# Patient Record
Sex: MALE | Race: WHITE | NOT HISPANIC OR LATINO
[De-identification: names, ages, dates, MRNs, and addresses within clinical notes are randomized per-mention and may not be internally consistent; named-entity substitution may affect disease eponyms.]

---

## 2017-04-03 ENCOUNTER — APPOINTMENT (OUTPATIENT)
Dept: ORTHOPEDIC SURGERY | Facility: CLINIC | Age: 72
End: 2017-04-03

## 2017-04-03 VITALS
HEART RATE: 67 BPM | SYSTOLIC BLOOD PRESSURE: 153 MMHG | BODY MASS INDEX: 24.43 KG/M2 | WEIGHT: 152 LBS | DIASTOLIC BLOOD PRESSURE: 71 MMHG | HEIGHT: 66 IN

## 2017-12-20 ENCOUNTER — OUTPATIENT (OUTPATIENT)
Dept: OUTPATIENT SERVICES | Facility: HOSPITAL | Age: 72
LOS: 1 days | End: 2017-12-20
Payer: MEDICARE

## 2017-12-20 VITALS
TEMPERATURE: 98 F | DIASTOLIC BLOOD PRESSURE: 83 MMHG | SYSTOLIC BLOOD PRESSURE: 136 MMHG | HEIGHT: 65.5 IN | WEIGHT: 149.91 LBS | OXYGEN SATURATION: 98 % | HEART RATE: 81 BPM | RESPIRATION RATE: 18 BRPM

## 2017-12-20 DIAGNOSIS — R22.9 LOCALIZED SWELLING, MASS AND LUMP, UNSPECIFIED: ICD-10-CM

## 2017-12-20 DIAGNOSIS — Z98.890 OTHER SPECIFIED POSTPROCEDURAL STATES: Chronic | ICD-10-CM

## 2017-12-20 DIAGNOSIS — Z01.818 ENCOUNTER FOR OTHER PREPROCEDURAL EXAMINATION: ICD-10-CM

## 2017-12-20 DIAGNOSIS — S60.459A SUPERFICIAL FOREIGN BODY OF UNSPECIFIED FINGER, INITIAL ENCOUNTER: ICD-10-CM

## 2017-12-20 LAB
ANION GAP SERPL CALC-SCNC: 8 MMOL/L — SIGNIFICANT CHANGE UP (ref 5–17)
BUN SERPL-MCNC: 16 MG/DL — SIGNIFICANT CHANGE UP (ref 7–23)
CALCIUM SERPL-MCNC: 9.3 MG/DL — SIGNIFICANT CHANGE UP (ref 8.4–10.5)
CHLORIDE SERPL-SCNC: 103 MMOL/L — SIGNIFICANT CHANGE UP (ref 96–108)
CO2 SERPL-SCNC: 30 MMOL/L — SIGNIFICANT CHANGE UP (ref 22–31)
CREAT SERPL-MCNC: 0.75 MG/DL — SIGNIFICANT CHANGE UP (ref 0.5–1.3)
GLUCOSE SERPL-MCNC: 121 MG/DL — HIGH (ref 70–99)
HBA1C BLD-MCNC: 6.3 % — HIGH (ref 4–5.6)
HCT VFR BLD CALC: 39.5 % — SIGNIFICANT CHANGE UP (ref 39–50)
HGB BLD-MCNC: 13.2 G/DL — SIGNIFICANT CHANGE UP (ref 13–17)
MCHC RBC-ENTMCNC: 33.3 GM/DL — SIGNIFICANT CHANGE UP (ref 32–36)
MCHC RBC-ENTMCNC: 33.4 PG — SIGNIFICANT CHANGE UP (ref 27–34)
MCV RBC AUTO: 100.3 FL — HIGH (ref 80–100)
PLATELET # BLD AUTO: 185 K/UL — SIGNIFICANT CHANGE UP (ref 150–400)
POTASSIUM SERPL-MCNC: 5 MMOL/L — SIGNIFICANT CHANGE UP (ref 3.5–5.3)
POTASSIUM SERPL-SCNC: 5 MMOL/L — SIGNIFICANT CHANGE UP (ref 3.5–5.3)
RBC # BLD: 3.94 M/UL — LOW (ref 4.2–5.8)
RBC # FLD: 11.8 % — SIGNIFICANT CHANGE UP (ref 10.3–14.5)
SODIUM SERPL-SCNC: 141 MMOL/L — SIGNIFICANT CHANGE UP (ref 135–145)
WBC # BLD: 6.1 K/UL — SIGNIFICANT CHANGE UP (ref 3.8–10.5)
WBC # FLD AUTO: 6.1 K/UL — SIGNIFICANT CHANGE UP (ref 3.8–10.5)

## 2017-12-20 PROCEDURE — 93005 ELECTROCARDIOGRAM TRACING: CPT

## 2017-12-20 PROCEDURE — 93010 ELECTROCARDIOGRAM REPORT: CPT | Mod: NC

## 2017-12-20 PROCEDURE — 85027 COMPLETE CBC AUTOMATED: CPT

## 2017-12-20 PROCEDURE — 80048 BASIC METABOLIC PNL TOTAL CA: CPT

## 2017-12-20 PROCEDURE — G0463: CPT

## 2017-12-20 PROCEDURE — 83036 HEMOGLOBIN GLYCOSYLATED A1C: CPT

## 2017-12-20 RX ORDER — PREGABALIN 225 MG/1
3 CAPSULE ORAL
Qty: 0 | Refills: 0 | COMMUNITY

## 2017-12-20 RX ORDER — SITAGLIPTIN AND METFORMIN HYDROCHLORIDE 500; 50 MG/1; MG/1
1 TABLET, FILM COATED ORAL
Qty: 0 | Refills: 0 | COMMUNITY

## 2017-12-20 RX ORDER — GLIMEPIRIDE 1 MG
0.5 TABLET ORAL
Qty: 0 | Refills: 0 | COMMUNITY

## 2017-12-20 NOTE — H&P PST ADULT - NSANTHOSAYNRD_GEN_A_CORE
No. SIDRA screening performed.  STOP BANG Legend: 0-2 = LOW Risk; 3-4 = INTERMEDIATE Risk; 5-8 = HIGH Risk

## 2019-01-18 PROBLEM — E11.9 TYPE 2 DIABETES MELLITUS WITHOUT COMPLICATIONS: Chronic | Status: ACTIVE | Noted: 2017-12-20

## 2019-01-18 PROBLEM — R22.9 LOCALIZED SWELLING, MASS AND LUMP, UNSPECIFIED: Chronic | Status: ACTIVE | Noted: 2017-12-20

## 2019-01-24 ENCOUNTER — APPOINTMENT (OUTPATIENT)
Dept: ORTHOPEDIC SURGERY | Facility: CLINIC | Age: 74
End: 2019-01-24
Payer: MEDICARE

## 2019-01-24 VITALS
HEIGHT: 66 IN | BODY MASS INDEX: 24.59 KG/M2 | HEART RATE: 76 BPM | DIASTOLIC BLOOD PRESSURE: 78 MMHG | WEIGHT: 153 LBS | SYSTOLIC BLOOD PRESSURE: 136 MMHG

## 2019-01-24 PROCEDURE — 73502 X-RAY EXAM HIP UNI 2-3 VIEWS: CPT | Mod: RT

## 2019-01-24 PROCEDURE — 72040 X-RAY EXAM NECK SPINE 2-3 VW: CPT

## 2019-01-24 PROCEDURE — 99214 OFFICE O/P EST MOD 30 MIN: CPT

## 2019-01-25 ENCOUNTER — APPOINTMENT (OUTPATIENT)
Dept: RADIOLOGY | Facility: CLINIC | Age: 74
End: 2019-01-25
Payer: MEDICARE

## 2019-01-25 ENCOUNTER — OUTPATIENT (OUTPATIENT)
Dept: OUTPATIENT SERVICES | Facility: HOSPITAL | Age: 74
LOS: 1 days | End: 2019-01-25
Payer: MEDICARE

## 2019-01-25 DIAGNOSIS — Z98.890 OTHER SPECIFIED POSTPROCEDURAL STATES: Chronic | ICD-10-CM

## 2019-01-25 DIAGNOSIS — M25.551 PAIN IN RIGHT HIP: ICD-10-CM

## 2019-01-25 PROCEDURE — 73525 CONTRAST X-RAY OF HIP: CPT

## 2019-01-25 PROCEDURE — 73525 CONTRAST X-RAY OF HIP: CPT | Mod: 26,RT

## 2019-01-25 PROCEDURE — 27093 INJECTION FOR HIP X-RAY: CPT | Mod: RT

## 2019-01-25 PROCEDURE — 27093 INJECTION FOR HIP X-RAY: CPT

## 2019-02-11 ENCOUNTER — APPOINTMENT (OUTPATIENT)
Dept: ORTHOPEDIC SURGERY | Facility: CLINIC | Age: 74
End: 2019-02-11
Payer: MEDICARE

## 2019-02-11 VITALS — DIASTOLIC BLOOD PRESSURE: 76 MMHG | SYSTOLIC BLOOD PRESSURE: 167 MMHG | HEART RATE: 71 BPM

## 2019-02-11 DIAGNOSIS — M25.551 PAIN IN RIGHT HIP: ICD-10-CM

## 2019-02-11 PROCEDURE — 99213 OFFICE O/P EST LOW 20 MIN: CPT | Mod: 25

## 2019-02-11 PROCEDURE — 20610 DRAIN/INJ JOINT/BURSA W/O US: CPT | Mod: RT

## 2019-03-14 ENCOUNTER — RX RENEWAL (OUTPATIENT)
Age: 74
End: 2019-03-14

## 2019-12-19 ENCOUNTER — APPOINTMENT (OUTPATIENT)
Dept: ORTHOPEDIC SURGERY | Facility: CLINIC | Age: 74
End: 2019-12-19
Payer: MEDICARE

## 2019-12-19 VITALS
DIASTOLIC BLOOD PRESSURE: 84 MMHG | SYSTOLIC BLOOD PRESSURE: 164 MMHG | BODY MASS INDEX: 24.59 KG/M2 | HEART RATE: 73 BPM | HEIGHT: 66 IN | WEIGHT: 153 LBS

## 2019-12-19 DIAGNOSIS — M79.641 PAIN IN RIGHT HAND: ICD-10-CM

## 2019-12-19 PROCEDURE — 99214 OFFICE O/P EST MOD 30 MIN: CPT

## 2019-12-19 PROCEDURE — 73130 X-RAY EXAM OF HAND: CPT | Mod: RT

## 2020-01-31 ENCOUNTER — APPOINTMENT (OUTPATIENT)
Dept: ORTHOPEDIC SURGERY | Facility: CLINIC | Age: 75
End: 2020-01-31
Payer: MEDICARE

## 2020-01-31 VITALS
SYSTOLIC BLOOD PRESSURE: 160 MMHG | BODY MASS INDEX: 24.59 KG/M2 | WEIGHT: 153 LBS | HEART RATE: 76 BPM | DIASTOLIC BLOOD PRESSURE: 88 MMHG | HEIGHT: 66 IN

## 2020-01-31 DIAGNOSIS — M79.604 PAIN IN RIGHT LEG: ICD-10-CM

## 2020-01-31 PROCEDURE — 73590 X-RAY EXAM OF LOWER LEG: CPT | Mod: RT

## 2020-01-31 PROCEDURE — 99214 OFFICE O/P EST MOD 30 MIN: CPT

## 2020-05-12 ENCOUNTER — APPOINTMENT (OUTPATIENT)
Dept: ORTHOPEDIC SURGERY | Facility: CLINIC | Age: 75
End: 2020-05-12
Payer: MEDICARE

## 2020-05-12 VITALS
DIASTOLIC BLOOD PRESSURE: 91 MMHG | WEIGHT: 153 LBS | BODY MASS INDEX: 24.59 KG/M2 | TEMPERATURE: 98.1 F | SYSTOLIC BLOOD PRESSURE: 185 MMHG | HEIGHT: 66 IN | HEART RATE: 80 BPM

## 2020-05-12 DIAGNOSIS — M54.5 LOW BACK PAIN: ICD-10-CM

## 2020-05-12 DIAGNOSIS — M54.32 SCIATICA, LEFT SIDE: ICD-10-CM

## 2020-05-12 PROCEDURE — 99214 OFFICE O/P EST MOD 30 MIN: CPT

## 2020-05-12 PROCEDURE — 72100 X-RAY EXAM L-S SPINE 2/3 VWS: CPT

## 2020-12-03 ENCOUNTER — TRANSCRIPTION ENCOUNTER (OUTPATIENT)
Age: 75
End: 2020-12-03

## 2020-12-03 ENCOUNTER — APPOINTMENT (OUTPATIENT)
Dept: NEUROSURGERY | Facility: CLINIC | Age: 75
End: 2020-12-03
Payer: MEDICARE

## 2020-12-03 VITALS — SYSTOLIC BLOOD PRESSURE: 158 MMHG | DIASTOLIC BLOOD PRESSURE: 82 MMHG

## 2020-12-03 VITALS
RESPIRATION RATE: 16 BRPM | HEIGHT: 66 IN | BODY MASS INDEX: 25.71 KG/M2 | TEMPERATURE: 97.2 F | OXYGEN SATURATION: 99 % | HEART RATE: 67 BPM | SYSTOLIC BLOOD PRESSURE: 153 MMHG | DIASTOLIC BLOOD PRESSURE: 80 MMHG | WEIGHT: 160 LBS

## 2020-12-03 DIAGNOSIS — Z78.9 OTHER SPECIFIED HEALTH STATUS: ICD-10-CM

## 2020-12-03 PROCEDURE — 99214 OFFICE O/P EST MOD 30 MIN: CPT

## 2020-12-03 RX ORDER — MELOXICAM 15 MG/1
15 TABLET ORAL DAILY
Qty: 30 | Refills: 1 | Status: DISCONTINUED | COMMUNITY
Start: 2019-03-14 | End: 2020-12-03

## 2020-12-03 RX ORDER — GLIMEPIRIDE 4 MG/1
TABLET ORAL
Refills: 0 | Status: ACTIVE | COMMUNITY

## 2020-12-10 NOTE — REASON FOR VISIT
[New Patient Visit] : a new patient visit [FreeTextEntry1] : Pulsatile Tinnitus [Follow-Up: _____] : a [unfilled] follow-up visit [Spouse] : spouse

## 2020-12-10 NOTE — HISTORY OF PRESENT ILLNESS
[FreeTextEntry1] : Miik Davila is a pleasant right handed 75 year old gentleman who presents for follow up regarding BILATERAL tinnitus.  Pt describes the noise as a loud pitched constant noise that started about 3 years ago however he reports a new noise in both ears that started about 1 month ago.  The new noise is intermittent in both ears and occurs when he turns head to either side.  Noise does not correlate to heartbeat.\par \par Gentle pressure on either side of neck does not change the noise and noise affects his quality of life 5-6/10.\par \par He had an audiogram with bilateral hearing loss L > R.  He has hearing aids bilaterally.\par \par He denies headaches, blurry and double vision.\par

## 2020-12-10 NOTE — ASSESSMENT
[FreeTextEntry1] : IMPRESSION:\par Constant tinnitus both ears with new intermittent sound in both ears when he turns his head to either side\par The sound is predominantly non-pulsatile\par Work-up in the past was unremarkable\par \par \par PLAN:\par 1. MRA brain w/wo TRICKS protocol if symptoms persist.\par 2. F/U after imaging.\par 3. Contact information for Dr.Maja Abraham provided.

## 2020-12-10 NOTE — HISTORY OF PRESENT ILLNESS
[FreeTextEntry1] : Miki Davila is a pleasant right handed 75 year old gentleman who presents for follow up regarding BILATERAL tinnitus.  Pt describes the noise as a loud pitched constant noise that started about 3 years ago however he reports a new noise in both ears that started about 1 month ago.  The new noise is intermittent in both ears and occurs when he turns head to either side.  Noise does not correlate to heartbeat.\par \par Gentle pressure on either side of neck does not change the noise and noise affects his quality of life 5-6/10.\par \par He had an audiogram with bilateral hearing loss L > R.  He has hearing aids bilaterally.\par \par He denies headaches, blurry and double vision.\par

## 2020-12-10 NOTE — PHYSICAL EXAM
[General Appearance - Alert] : alert [General Appearance - In No Acute Distress] : in no acute distress [General Appearance - Well Nourished] : well nourished [General Appearance - Well Developed] : well developed [General Appearance - Well-Appearing] : healthy appearing [Oriented To Time, Place, And Person] : oriented to person, place, and time [Impaired Insight] : insight and judgment were intact [Affect] : the affect was normal [Memory Recent] : recent memory was not impaired [Person] : oriented to person [Place] : oriented to place [Time] : oriented to time [Cranial Nerves Optic (II)] : visual acuity intact bilaterally,  pupils equal round and reactive to light [Cranial Nerves Oculomotor (III)] : extraocular motion intact [Cranial Nerves Trigeminal (V)] : facial sensation intact symmetrically [Cranial Nerves Facial (VII)] : face symmetrical [Cranial Nerves Vestibulocochlear (VIII)] : hearing was intact bilaterally [Cranial Nerves Glossopharyngeal (IX)] : tongue and palate midline [Cranial Nerves Accessory (XI - Cranial And Spinal)] : head turning and shoulder shrug symmetric [Cranial Nerves Hypoglossal (XII)] : there was no tongue deviation with protrusion [Motor Strength] : muscle strength was normal in all four extremities [Motor Handedness Right-Handed] : the patient is right hand dominant [Balance] : balance was intact [Sclera] : the sclera and conjunctiva were normal [PERRL With Normal Accommodation] : pupils were equal in size, round, reactive to light, with normal accommodation [Extraocular Movements] : extraocular movements were intact [Outer Ear] : the ears and nose were normal in appearance [Hearing Threshold Finger Rub Not [place]] : hearing was normal [Oropharynx] : the oropharynx was normal [Neck Appearance] : the appearance of the neck was normal [Respiration, Rhythm And Depth] : normal respiratory rhythm and effort [Exaggerated Use Of Accessory Muscles For Inspiration] : no accessory muscle use [Heart Rate And Rhythm] : heart rate was normal and rhythm regular [Abnormal Walk] : normal gait [Involuntary Movements] : no involuntary movements were seen [Motor Tone] : muscle strength and tone were normal [Skin Color & Pigmentation] : normal skin color and pigmentation [] : no rash

## 2020-12-10 NOTE — DATA REVIEWED
[de-identified] : MRI BRAIN IAC W/WO DONE 3/9/20 AT [facility] AND MR BRAIN IAC W/WO 3/17/17 DONE AT [facility] [place]

## 2020-12-10 NOTE — PHYSICAL EXAM
[General Appearance - Alert] : alert [General Appearance - In No Acute Distress] : in no acute distress [General Appearance - Well Nourished] : well nourished [General Appearance - Well Developed] : well developed [General Appearance - Well-Appearing] : healthy appearing [Oriented To Time, Place, And Person] : oriented to person, place, and time [Impaired Insight] : insight and judgment were intact [Affect] : the affect was normal [Memory Recent] : recent memory was not impaired [Person] : oriented to person [Place] : oriented to place [Time] : oriented to time [Cranial Nerves Optic (II)] : visual acuity intact bilaterally,  pupils equal round and reactive to light [Cranial Nerves Oculomotor (III)] : extraocular motion intact [Cranial Nerves Trigeminal (V)] : facial sensation intact symmetrically [Cranial Nerves Facial (VII)] : face symmetrical [Cranial Nerves Vestibulocochlear (VIII)] : hearing was intact bilaterally [Cranial Nerves Glossopharyngeal (IX)] : tongue and palate midline [Cranial Nerves Accessory (XI - Cranial And Spinal)] : head turning and shoulder shrug symmetric [Cranial Nerves Hypoglossal (XII)] : there was no tongue deviation with protrusion [Motor Strength] : muscle strength was normal in all four extremities [Motor Handedness Right-Handed] : the patient is right hand dominant [Balance] : balance was intact [Sclera] : the sclera and conjunctiva were normal [PERRL With Normal Accommodation] : pupils were equal in size, round, reactive to light, with normal accommodation [Extraocular Movements] : extraocular movements were intact [Outer Ear] : the ears and nose were normal in appearance [Hearing Threshold Finger Rub Not [place]] : hearing was normal [Oropharynx] : the oropharynx was normal [Neck Appearance] : the appearance of the neck was normal [Respiration, Rhythm And Depth] : normal respiratory rhythm and effort [Exaggerated Use Of Accessory Muscles For Inspiration] : no accessory muscle use [Heart Rate And Rhythm] : heart rate was normal and rhythm regular [Abnormal Walk] : normal gait [Involuntary Movements] : no involuntary movements were seen [Motor Tone] : muscle strength and tone were normal [Skin Color & Pigmentation] : normal skin color and pigmentation [] : no rash

## 2020-12-10 NOTE — DATA REVIEWED
[de-identified] : MRI BRAIN IAC W/WO DONE 3/9/20 AT [facility] AND MR BRAIN IAC W/WO 3/17/[place] [facility] [place]

## 2021-01-14 ENCOUNTER — APPOINTMENT (OUTPATIENT)
Dept: NEUROSURGERY | Facility: CLINIC | Age: 76
End: 2021-01-14
Payer: MEDICARE

## 2021-01-14 DIAGNOSIS — H93.A9 PULSATILE TINNITUS, UNSPECIFIED EAR: ICD-10-CM

## 2021-01-14 PROCEDURE — 99213 OFFICE O/P EST LOW 20 MIN: CPT

## 2021-01-20 NOTE — ASSESSMENT
[FreeTextEntry1] : Impression:\par Non-pulsatile tinnitus\par Unremarkable MRA\par Nothing to offer\par \par Plan:\par Referral to Dr. Zhao

## 2021-01-20 NOTE — HISTORY OF PRESENT ILLNESS
[FreeTextEntry1] : Miki Davila is a pleasant right handed 75 year old gentleman who presents for follow up and imaging review regarding BILATERAL tinnitus. Pt describes the noise as a loud pitched constant noise that started about 3 years ago however he reports a new noise in both ears that started about 1 month ago. The new noise is intermittent in both ears and occurs when he turns head to either side. Noise does not correlate to heartbeat.\par \par Gentle pressure on either side of neck does not change the noise and noise affects his quality of life 5-6/10.\par \par He had an audiogram with bilateral hearing loss L > R. He has hearing aids bilaterally.\par \par He denies headaches, blurry and double vision.

## 2021-03-12 ENCOUNTER — APPOINTMENT (OUTPATIENT)
Dept: OTOLARYNGOLOGY | Facility: CLINIC | Age: 76
End: 2021-03-12
Payer: MEDICARE

## 2021-03-12 VITALS
HEIGHT: 66 IN | HEART RATE: 69 BPM | BODY MASS INDEX: 26.03 KG/M2 | WEIGHT: 162 LBS | DIASTOLIC BLOOD PRESSURE: 86 MMHG | SYSTOLIC BLOOD PRESSURE: 144 MMHG

## 2021-03-12 DIAGNOSIS — J31.0 CHRONIC RHINITIS: ICD-10-CM

## 2021-03-12 DIAGNOSIS — H90.5 UNSPECIFIED SENSORINEURAL HEARING LOSS: ICD-10-CM

## 2021-03-12 DIAGNOSIS — H93.13 TINNITUS, BILATERAL: ICD-10-CM

## 2021-03-12 DIAGNOSIS — H93.293 OTHER ABNORMAL AUDITORY PERCEPTIONS, BILATERAL: ICD-10-CM

## 2021-03-12 PROCEDURE — 92567 TYMPANOMETRY: CPT

## 2021-03-12 PROCEDURE — 92557 COMPREHENSIVE HEARING TEST: CPT

## 2021-03-12 PROCEDURE — 99204 OFFICE O/P NEW MOD 45 MIN: CPT | Mod: 25

## 2021-03-12 PROCEDURE — 31231 NASAL ENDOSCOPY DX: CPT | Mod: 52

## 2021-03-12 PROCEDURE — 92504 EAR MICROSCOPY EXAMINATION: CPT

## 2021-03-12 NOTE — PROCEDURE
[FreeTextEntry3] : Procedure note:  Binocular microscopy\par \par Mar 12, 2021 \par \par Inspection of the ears was performed under binocular microscopy because of need to accurately visualize otologic landmarks and potential pathologic conditions that would not otherwise be visible through simple otoscopic exam.\par  [FreeTextEntry6] : Procedure note: Nasal endoscopy\par \par Mar 12, 2021 \par \par Description of Procedure:  Nasal endoscopy was performed because of recalcitrant symptoms of nasal obstruction and/or chronic rhinitis, and anterior anatomic abnormalities precluding visualization. Topical decongestant and/or anesthetic was administered to the nasal cavity.  Using a flexible endoscope with sheath, the nasal mucosa, septum, turbinates, and ostiomeatal complex were examined.  \par \par Nasal mucosa findings:  the nasal mucosa was edematous with crusting on right and dried blood.\par Septum findings:  the septum was deviated to the right.\par Nasopharynx findings:  the nasopharynx was normal.\par Middle meatus findings:  the middle meatus had no abnormalities.\par Sinuses findings:  the paranasal sinuses had no abnormalities.\par

## 2021-03-12 NOTE — HISTORY OF PRESENT ILLNESS
[de-identified] : 75 year male referred by Dr Eleanor Jerez, Neurosurgeon for bilateral tinnitus. States constant bilateral tinnitus for the past 3 years. States does not vary in intensity. Denies head/otologic trauma. 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 States has bilateral hearing aids, but does not wear them consistently. Denies new medication. Denies family history of hearing loss. MRI 01/14/2021 at [facility]. Denies tinnitus preventing from falling asleep at night. States generally sleep well. States takes Flavonoid and Sinupro in the past, but tinnitus has not resolved.

## 2021-03-12 NOTE — REASON FOR VISIT
[Initial Consultation] : an initial consultation for [FreeTextEntry2] : referred by Dr Eleanor Jerez, Neurosurgeon for bilateral tinnitus.

## 2022-12-12 ENCOUNTER — APPOINTMENT (OUTPATIENT)
Dept: ORTHOPEDIC SURGERY | Facility: CLINIC | Age: 77
End: 2022-12-12

## 2022-12-12 VITALS
TEMPERATURE: 97.6 F | BODY MASS INDEX: 25.71 KG/M2 | OXYGEN SATURATION: 99 % | HEART RATE: 71 BPM | HEIGHT: 66 IN | WEIGHT: 160 LBS | SYSTOLIC BLOOD PRESSURE: 156 MMHG | DIASTOLIC BLOOD PRESSURE: 84 MMHG

## 2022-12-12 DIAGNOSIS — M47.812 SPONDYLOSIS W/OUT MYELOPATHY OR RADICULOPATHY, CERVICAL REGION: ICD-10-CM

## 2022-12-12 PROCEDURE — 72040 X-RAY EXAM NECK SPINE 2-3 VW: CPT

## 2022-12-12 PROCEDURE — 73030 X-RAY EXAM OF SHOULDER: CPT | Mod: LT

## 2022-12-12 PROCEDURE — 20610 DRAIN/INJ JOINT/BURSA W/O US: CPT | Mod: LT

## 2022-12-12 PROCEDURE — 99214 OFFICE O/P EST MOD 30 MIN: CPT | Mod: 25

## 2022-12-12 NOTE — PHYSICAL EXAM
[Rad] : radial 2+ and symmetric bilaterally [Normal] : Alert and in no acute distress [Poor Appearance] : well-appearing [Acute Distress] : not in acute distress [Obese] : not obese [de-identified] : The patient has no respiratory distress. Mood and affect are normal. The patient is alert and oriented to person, place and time.\par Examination of the cervical spine demonstrates no deformity. There is tenderness of the left paracervical muscles and the left trapezius muscle. There is mild muscle spasm. Cervical spine range of motion is right lateral rotation of 40°, left lateral rotation of 40°, extension of 45° and flexion of 45°. Upper extremity neurologic exam is intact with regard to sensation. Motor function is 5 over 5 in all groups in the upper extremities. Deep tendon reflexes are 2+ and equal at the biceps, triceps and brachial radialis.\par Examination of the left shoulder demonstrates no deformity. The skin is intact. There is no erythema. There is tenderness anteriorly. Impingement sign is positive There is no instability. Drop arm test is negative. Empty can test is negative. Liftoff test is negative. Swan test is negative.  He has elevation of 140 degrees left, 150 degrees right.  He has external rotation of 50 degrees bilaterally.  He has internal rotation to the upper lumbar level bilaterally.  The elbows are stable.  There is no lymphedema. [de-identified] : AP and lateral x-rays of the cervical spine demonstrate multilevel degenerative changes.  There are no fractures or dislocations.  AP, transscapular and axillary x-rays of the left shoulder demonstrate no fracture, no dislocation and no bony abnormality.

## 2022-12-12 NOTE — HISTORY OF PRESENT ILLNESS
[de-identified] : 77 year old male presents for evaluation of left shoulder and left sided neck pain x 6 months. Denies any trauma or injury. He complains of constant aching and throbbing pain in the left shoulder radiating up the left side of his neck. He feels tightness in his left trapezius. He reports the pain worsens with any movements of the shoulder. He has not tried any modalities for the pain. He is interested in receiving a cortisone injection for the left shoulder.

## 2022-12-12 NOTE — DISCUSSION/SUMMARY
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  He has tendinitis of the left shoulder.  I have discussed the pathology, natural history and treatment options with him.  He would like to try subacromial steroid injection for the left shoulder.\par Informed consent is obtained. Site and procedure were confirmed with the patient.  Following a sterile prep with alcohol an injection is placed into the subacromial space of the left shoulder. The injection consists of 1 cc of Depo-Medrol and 8 cc of 1% Xylocaine. The injection was well tolerated. Instructions were given.\par He will work on a home exercise program.  If symptomatic in 1 month he will return.

## 2023-04-06 ENCOUNTER — APPOINTMENT (OUTPATIENT)
Dept: ORTHOPEDIC SURGERY | Facility: CLINIC | Age: 78
End: 2023-04-06
Payer: MEDICARE

## 2023-04-06 VITALS
WEIGHT: 160 LBS | BODY MASS INDEX: 25.71 KG/M2 | SYSTOLIC BLOOD PRESSURE: 152 MMHG | HEIGHT: 66 IN | DIASTOLIC BLOOD PRESSURE: 80 MMHG | OXYGEN SATURATION: 95 % | TEMPERATURE: 97.2 F | HEART RATE: 75 BPM

## 2023-04-06 DIAGNOSIS — M19.012 PRIMARY OSTEOARTHRITIS, LEFT SHOULDER: ICD-10-CM

## 2023-04-06 DIAGNOSIS — M25.512 PAIN IN LEFT SHOULDER: ICD-10-CM

## 2023-04-06 PROCEDURE — 99204 OFFICE O/P NEW MOD 45 MIN: CPT | Mod: 25

## 2023-04-06 PROCEDURE — 20611 DRAIN/INJ JOINT/BURSA W/US: CPT | Mod: LT

## 2023-04-06 NOTE — PHYSICAL EXAM
[de-identified] : Constitutional: Well-nourished, well-developed, No acute distress\par Respiratory:  Good respiratory effort, no SOB\par Lymphatic: No regional lymphadenopathy, no lymphedema\par Psychiatric: Pleasant and normal affect, alert and oriented x3\par Musculoskeletal: normal except where as noted in regional exam\par \par Left shoulder:\par APPEARANCE: no marked deformities, no swelling or malalignment\par POSITIVE TENDERNESS: + Diffusely throughout the anterior/lateral/posterior shoulder, + anterior/posterior capsule\par NONTENDER: supraspinatus, infraspinatus, teres minor. biceps. AC joint. \par ROM: Significantly limited in all planes active and passive motion \par RESISTIVE TESTING: + pain, 4/5 resisted flex/ext, empty can/ER/IR, horizontal abd/add \par SPECIAL TESTS: + apley's scratch test for limited motion and pain, + drop arm for pain without sig weakness, + empty can for pain without sig weakness\par  [de-identified] : I reviewed, interpreted and clinically correlated the following outside imaging studies,\par In system x-rays, 3 views of the left shoulder, evidence of moderate glenohumeral osteoarthritis

## 2023-04-06 NOTE — HISTORY OF PRESENT ILLNESS
[de-identified] : Patient is here for left shoulder pain. This has been a chronic issue. He has been under the care of Dr. Denis and was treated with steroid injection in 12/22. He would like to proceed with HA injection. \par \par The patient's past medical history, past surgical history, medications and allergies were reviewed by me today and documented accordingly. In addition, the patient's family and social history, which were noncontributory to this visit, were reviewed also. Intake form was reviewed. The patient has no family history of arthritis.

## 2023-04-06 NOTE — DISCUSSION/SUMMARY
[de-identified] : Patient was seen today for evaluation and management of chronic intermittent left shoulder pain secondary to underlying moderate glenohumeral osteoarthritis. This has been a chronic issue for the patient with recent atraumatic exacerbation. We discussed various treatment options as well as associated risk/benefits/alternatives and patient elected to proceed with hyaluronic acid injection therapy.  A single dural Jorge Luis injection was given today under sterile conditions into the Right glenohumeral joint without complication (see procedure note). The patient was instructed on modification of activities over the next 48-72 hours. I advised the patient to ice the shoulder as needed for control of local irritation from the injection. I advised that some patients have immediate benefit from the initial injection therapy, however it usually takes the medication a number of weeks (~8wks) to provide significant relief of osteoarthritic symptoms. If no significant long-term benefit, the patient may elect for additional treatment strategies as previously discussed. However, if the patient obtains good relief of symptoms, the injection therapy series can be repeated over the next 6-12 months.\par \par Will have the patient followup on an as-needed basis at this time.  Patient and spouse appreciate and agree with current plan.\par \par \par This note was generated using dragon medical dictation software.  A reasonable effort has been made for proofreading its contents, but typos may still remain.  If there are any questions or points of clarification needed please notify my office.

## 2023-04-06 NOTE — PROCEDURE
[de-identified] : Ultrasound Guided Injection \par Indication: Ensure placement within the glenohumeral joint\par \par Utlizing the SonTripFabte II Edge portable ultrasound machine, the Curvilinear 30cm 5-2 MHz transducer, sterile probe cover and sterile ultrasound gel, ultrasound guidance with the probe in the transverse axis, utilizing an in-plane approach, was used for the following injection:\par \par Injection: Left Glenohumeral Joint.\par Indication: Osteoarthritis.\par \par A discussion was had with the patient regarding this procedure and all questions were answered. All risks, benefits and alternatives were discussed. These included but were not limited to bleeding, infection, and allergic reaction. A timeout was done to ensure correct side and patient agreed to the procedure.  A [place] rosibel was created on the skin utilizing a plastic needle cap to rosibel the anticipated point of entry. Alcohol was used to clean the skin, and betadine was used to sterilize and prep the area in the posterior aspect of the shoulder. Ethyl chloride spray was then used as a topical anesthetic. A 22-gauge 2" needle was placed on a standard Durolane syringe and the solution was injected into the glenohumeral joint. A sterile bandage was then applied. The patient tolerated the procedure well and there were no complications. \par \par Lot #: [id number]\par Exp: 7/31/25\par

## 2025-05-02 NOTE — H&P PST ADULT - NS PRO ABUSE SCREEN AFRAID ANYONE YN
We want to give the best care possible. If you are notified to take a patient experience survey, please take the time to answer the questions. Your feedback helps us know how we are doing and we really appreciate it. Thank you!    Try Miralax or Metamucil for constipation.    Call [phone] to schedule MRI lumbar spine.   no

## 2025-06-10 ENCOUNTER — APPOINTMENT (OUTPATIENT)
Dept: PODIATRY | Facility: CLINIC | Age: 80
End: 2025-06-10
Payer: MEDICARE

## 2025-06-10 PROBLEM — M20.42 HAMMER TOE OF LEFT FOOT: Status: ACTIVE | Noted: 2025-06-10

## 2025-06-10 PROBLEM — R20.0 NUMBNESS IN FEET: Status: ACTIVE | Noted: 2025-06-10

## 2025-06-10 PROBLEM — M77.42 METATARSALGIA OF LEFT FOOT: Status: ACTIVE | Noted: 2025-06-10

## 2025-06-10 PROCEDURE — 99203 OFFICE O/P NEW LOW 30 MIN: CPT

## 2025-06-20 ENCOUNTER — NON-APPOINTMENT (OUTPATIENT)
Age: 80
End: 2025-06-20

## 2025-06-20 ENCOUNTER — APPOINTMENT (OUTPATIENT)
Age: 80
End: 2025-06-20
Payer: MEDICARE

## 2025-06-20 PROCEDURE — 92201 OPSCPY EXTND RTA DRAW UNI/BI: CPT

## 2025-06-20 PROCEDURE — 92014 COMPRE OPH EXAM EST PT 1/>: CPT

## 2025-06-20 PROCEDURE — 92134 CPTRZ OPH DX IMG PST SGM RTA: CPT

## 2025-07-07 ENCOUNTER — APPOINTMENT (OUTPATIENT)
Dept: ORTHOPEDIC SURGERY | Facility: CLINIC | Age: 80
End: 2025-07-07
Payer: MEDICARE

## 2025-07-07 VITALS
HEART RATE: 84 BPM | SYSTOLIC BLOOD PRESSURE: 127 MMHG | WEIGHT: 150 LBS | BODY MASS INDEX: 24.11 KG/M2 | HEIGHT: 66 IN | DIASTOLIC BLOOD PRESSURE: 70 MMHG

## 2025-07-07 PROBLEM — M19.011 PRIMARY OSTEOARTHRITIS OF RIGHT SHOULDER: Status: ACTIVE | Noted: 2025-07-07

## 2025-07-07 PROCEDURE — 20610 DRAIN/INJ JOINT/BURSA W/O US: CPT | Mod: RT

## 2025-07-07 PROCEDURE — 72040 X-RAY EXAM NECK SPINE 2-3 VW: CPT

## 2025-07-07 PROCEDURE — 99214 OFFICE O/P EST MOD 30 MIN: CPT | Mod: 25

## 2025-07-07 PROCEDURE — 73030 X-RAY EXAM OF SHOULDER: CPT | Mod: 50

## 2025-07-08 ENCOUNTER — APPOINTMENT (OUTPATIENT)
Dept: PODIATRY | Facility: CLINIC | Age: 80
End: 2025-07-08
Payer: MEDICARE

## 2025-07-08 PROCEDURE — 64455 NJX AA&/STRD PLTR COM DG NRV: CPT | Mod: LT

## 2025-07-08 PROCEDURE — 99213 OFFICE O/P EST LOW 20 MIN: CPT | Mod: 25

## 2025-07-21 ENCOUNTER — APPOINTMENT (OUTPATIENT)
Dept: ORTHOPEDIC SURGERY | Facility: CLINIC | Age: 80
End: 2025-07-21
Payer: MEDICARE

## 2025-07-21 VITALS
DIASTOLIC BLOOD PRESSURE: 68 MMHG | HEART RATE: 86 BPM | SYSTOLIC BLOOD PRESSURE: 118 MMHG | WEIGHT: 150 LBS | HEIGHT: 66 IN | BODY MASS INDEX: 24.11 KG/M2

## 2025-07-21 DIAGNOSIS — M19.012 PRIMARY OSTEOARTHRITIS, LEFT SHOULDER: ICD-10-CM

## 2025-07-21 PROCEDURE — 20610 DRAIN/INJ JOINT/BURSA W/O US: CPT | Mod: LT

## 2025-07-21 PROCEDURE — 99213 OFFICE O/P EST LOW 20 MIN: CPT | Mod: 25

## 2025-07-22 ENCOUNTER — APPOINTMENT (OUTPATIENT)
Dept: PODIATRY | Facility: CLINIC | Age: 80
End: 2025-07-22
Payer: MEDICARE

## 2025-07-22 DIAGNOSIS — G57.62 LESION OF PLANTAR NERVE, LEFT LOWER LIMB: ICD-10-CM

## 2025-07-22 PROCEDURE — 64455 NJX AA&/STRD PLTR COM DG NRV: CPT | Mod: LT

## 2025-08-22 ENCOUNTER — APPOINTMENT (OUTPATIENT)
Dept: PODIATRY | Facility: CLINIC | Age: 80
End: 2025-08-22

## 2025-08-22 DIAGNOSIS — E11.49 TYPE 2 DIABETES MELLITUS WITH OTHER DIABETIC NEUROLOGICAL COMPLICATION: ICD-10-CM

## 2025-08-22 DIAGNOSIS — G57.62 LESION OF PLANTAR NERVE, LEFT LOWER LIMB: ICD-10-CM

## 2025-08-22 PROCEDURE — 99212 OFFICE O/P EST SF 10 MIN: CPT | Mod: 25

## 2025-08-22 PROCEDURE — 64455 NJX AA&/STRD PLTR COM DG NRV: CPT | Mod: LT

## 2025-08-29 PROBLEM — E11.49 DM (DIABETES MELLITUS), TYPE 2 WITH NEUROLOGICAL COMPLICATIONS: Status: ACTIVE | Noted: 2025-08-29
